# Patient Record
Sex: FEMALE | Race: WHITE | NOT HISPANIC OR LATINO | Employment: FULL TIME | URBAN - METROPOLITAN AREA
[De-identification: names, ages, dates, MRNs, and addresses within clinical notes are randomized per-mention and may not be internally consistent; named-entity substitution may affect disease eponyms.]

---

## 2017-01-03 RX ORDER — NORETHINDRONE ACETATE AND ETHINYL ESTRADIOL 1MG-20(21)
KIT ORAL
Qty: 28 TABLET | Refills: 2 | Status: SHIPPED | OUTPATIENT
Start: 2017-01-03 | End: 2022-11-21

## 2024-02-15 ENCOUNTER — LAB VISIT (OUTPATIENT)
Dept: LAB | Facility: HOSPITAL | Age: 49
End: 2024-02-15
Attending: NURSE PRACTITIONER
Payer: COMMERCIAL

## 2024-02-15 ENCOUNTER — OFFICE VISIT (OUTPATIENT)
Dept: PRIMARY CARE CLINIC | Facility: CLINIC | Age: 49
End: 2024-02-15
Payer: COMMERCIAL

## 2024-02-15 VITALS
OXYGEN SATURATION: 98 % | SYSTOLIC BLOOD PRESSURE: 120 MMHG | HEIGHT: 63 IN | WEIGHT: 275.44 LBS | DIASTOLIC BLOOD PRESSURE: 78 MMHG | HEART RATE: 94 BPM | BODY MASS INDEX: 48.8 KG/M2

## 2024-02-15 DIAGNOSIS — Z13.220 ENCOUNTER FOR LIPID SCREENING FOR CARDIOVASCULAR DISEASE: ICD-10-CM

## 2024-02-15 DIAGNOSIS — Z13.6 ENCOUNTER FOR LIPID SCREENING FOR CARDIOVASCULAR DISEASE: ICD-10-CM

## 2024-02-15 DIAGNOSIS — R06.81 APNEA: ICD-10-CM

## 2024-02-15 DIAGNOSIS — Z00.00 ANNUAL PHYSICAL EXAM: Primary | ICD-10-CM

## 2024-02-15 DIAGNOSIS — Z12.11 COLON CANCER SCREENING: ICD-10-CM

## 2024-02-15 DIAGNOSIS — Z11.59 ENCOUNTER FOR HEPATITIS C SCREENING TEST FOR LOW RISK PATIENT: ICD-10-CM

## 2024-02-15 DIAGNOSIS — Z00.00 ANNUAL PHYSICAL EXAM: ICD-10-CM

## 2024-02-15 DIAGNOSIS — E66.01 CLASS 3 SEVERE OBESITY DUE TO EXCESS CALORIES WITHOUT SERIOUS COMORBIDITY WITH BODY MASS INDEX (BMI) OF 45.0 TO 49.9 IN ADULT: ICD-10-CM

## 2024-02-15 DIAGNOSIS — Z13.1 SCREENING FOR DIABETES MELLITUS: ICD-10-CM

## 2024-02-15 PROBLEM — E66.813 CLASS 3 SEVERE OBESITY DUE TO EXCESS CALORIES WITHOUT SERIOUS COMORBIDITY WITH BODY MASS INDEX (BMI) OF 45.0 TO 49.9 IN ADULT: Status: ACTIVE | Noted: 2024-02-15

## 2024-02-15 LAB
ALBUMIN SERPL BCP-MCNC: 3.6 G/DL (ref 3.5–5.2)
ALP SERPL-CCNC: 42 U/L (ref 55–135)
ALT SERPL W/O P-5'-P-CCNC: 16 U/L (ref 10–44)
ANION GAP SERPL CALC-SCNC: 10 MMOL/L (ref 8–16)
AST SERPL-CCNC: 14 U/L (ref 10–40)
BILIRUB SERPL-MCNC: 0.3 MG/DL (ref 0.1–1)
BUN SERPL-MCNC: 10 MG/DL (ref 6–20)
CALCIUM SERPL-MCNC: 9 MG/DL (ref 8.7–10.5)
CHLORIDE SERPL-SCNC: 104 MMOL/L (ref 95–110)
CHOLEST SERPL-MCNC: 184 MG/DL (ref 120–199)
CHOLEST/HDLC SERPL: 3.9 {RATIO} (ref 2–5)
CO2 SERPL-SCNC: 25 MMOL/L (ref 23–29)
CREAT SERPL-MCNC: 0.8 MG/DL (ref 0.5–1.4)
EST. GFR  (NO RACE VARIABLE): >60 ML/MIN/1.73 M^2
GLUCOSE SERPL-MCNC: 87 MG/DL (ref 70–110)
HDLC SERPL-MCNC: 47 MG/DL (ref 40–75)
HDLC SERPL: 25.5 % (ref 20–50)
LDLC SERPL CALC-MCNC: 117 MG/DL (ref 63–159)
NONHDLC SERPL-MCNC: 137 MG/DL
POTASSIUM SERPL-SCNC: 4.4 MMOL/L (ref 3.5–5.1)
PROT SERPL-MCNC: 7.1 G/DL (ref 6–8.4)
SODIUM SERPL-SCNC: 139 MMOL/L (ref 136–145)
TRIGL SERPL-MCNC: 100 MG/DL (ref 30–150)
TSH SERPL DL<=0.005 MIU/L-ACNC: 1.42 UIU/ML (ref 0.4–4)

## 2024-02-15 PROCEDURE — 1159F MED LIST DOCD IN RCRD: CPT | Mod: CPTII,S$GLB,, | Performed by: NURSE PRACTITIONER

## 2024-02-15 PROCEDURE — 3074F SYST BP LT 130 MM HG: CPT | Mod: CPTII,S$GLB,, | Performed by: NURSE PRACTITIONER

## 2024-02-15 PROCEDURE — 3078F DIAST BP <80 MM HG: CPT | Mod: CPTII,S$GLB,, | Performed by: NURSE PRACTITIONER

## 2024-02-15 PROCEDURE — 1160F RVW MEDS BY RX/DR IN RCRD: CPT | Mod: CPTII,S$GLB,, | Performed by: NURSE PRACTITIONER

## 2024-02-15 PROCEDURE — 3008F BODY MASS INDEX DOCD: CPT | Mod: CPTII,S$GLB,, | Performed by: NURSE PRACTITIONER

## 2024-02-15 PROCEDURE — 85027 COMPLETE CBC AUTOMATED: CPT | Performed by: NURSE PRACTITIONER

## 2024-02-15 PROCEDURE — 36415 COLL VENOUS BLD VENIPUNCTURE: CPT | Performed by: NURSE PRACTITIONER

## 2024-02-15 PROCEDURE — 80061 LIPID PANEL: CPT | Performed by: NURSE PRACTITIONER

## 2024-02-15 PROCEDURE — 84443 ASSAY THYROID STIM HORMONE: CPT | Performed by: NURSE PRACTITIONER

## 2024-02-15 PROCEDURE — 80053 COMPREHEN METABOLIC PANEL: CPT | Performed by: NURSE PRACTITIONER

## 2024-02-15 PROCEDURE — 99203 OFFICE O/P NEW LOW 30 MIN: CPT | Mod: S$GLB,,, | Performed by: NURSE PRACTITIONER

## 2024-02-15 PROCEDURE — 99999 PR PBB SHADOW E&M-EST. PATIENT-LVL IV: CPT | Mod: PBBFAC,,, | Performed by: NURSE PRACTITIONER

## 2024-02-15 NOTE — PROGRESS NOTES
Assessment/Plan:    Problem List Items Addressed This Visit          Endocrine    Class 3 severe obesity due to excess calories without serious comorbidity with body mass index (BMI) of 45.0 to 49.9 in adult    Overview     Body mass index is 48.8 kg/m². Morbid obesity complicates all aspects of disease management from diagnostic modalities to treatment. Weight loss encouraged and health benefits explained to patient.             Other Visit Diagnoses       Annual physical exam    -  Primary    Relevant Orders    Comprehensive Metabolic Panel    CBC Without Differential    TSH    Encounter for lipid screening for cardiovascular disease        Relevant Orders    Lipid Panel    Encounter for hepatitis C screening test for low risk patient        Relevant Orders    HEPATITIS C ANTIBODY    Colon cancer screening        Relevant Orders    Ambulatory referral/consult to Endo Procedure     Apnea        Relevant Orders    Home Sleep Study    Screening for diabetes mellitus        Relevant Orders    HEMOGLOBIN A1C            Follow up in about 6 months (around 8/15/2024).    Matilda Yo NP  ______________________________________________________________________________________________________________________________    CC: Establish care    HPI:    Patient is a new patient to me here to establish care. 47y/o female with no past medical history.     Sleep Apnea: Patient presents with possible obstructive sleep apnea. Patent has a several months history of symptoms of daytime fatigue. Patient generally gets 5 or 6 hours of sleep per night, and states they generally have nightime awakenings and difficulty falling back asleep if awakened. Snoring of moderate severity is present. Apneic episodes is present. Nasal obstruction is not present.  Patient has not had tonsillectomy.       Previous PCP: None   [facility]-Dr. Mary Muñoz     No other new complaints today.  Remaining chronic conditions have been reviewed and  "remain stable. Further detail as stated above.      HM reviewed.    Past Medical History:  History reviewed. No pertinent past medical history.  Past Surgical History:   Procedure Laterality Date    ADENOIDECTOMY       SECTION      x 2     Review of patient's allergies indicates:  No Known Allergies  Social History     Tobacco Use    Smoking status: Never    Smokeless tobacco: Never   Substance Use Topics    Alcohol use: Yes     Comment: socially    Drug use: No     Family History   Problem Relation Age of Onset    Diabetes Father     Diabetes Daughter     Insulin resistance Paternal Aunt     Colon cancer Maternal Grandfather     Breast cancer Neg Hx     Cancer Neg Hx     Eclampsia Neg Hx     Hypertension Neg Hx     Miscarriages / Stillbirths Neg Hx      labor Neg Hx     Ovarian cancer Neg Hx     Stroke Neg Hx      Current Outpatient Medications on File Prior to Visit   Medication Sig Dispense Refill    [DISCONTINUED] multivitamin (THERAGRAN) per tablet Take 1 tablet by mouth once daily.      [DISCONTINUED] valACYclovir (VALTREX) 500 MG tablet Take 1 tablet (500 mg total) by mouth 2 (two) times daily. 60 tablet 5     No current facility-administered medications on file prior to visit.       Review of Systems   Constitutional: Negative.    HENT: Negative.     Eyes: Negative.    Respiratory: Negative.     Cardiovascular: Negative.    Gastrointestinal: Negative.    Endocrine: Negative.    Genitourinary: Negative.    Musculoskeletal: Negative.    Skin: Negative.    Allergic/Immunologic: Negative.    Neurological: Negative.    Hematological: Negative.    Psychiatric/Behavioral: Negative.         Vitals:    02/15/24 0748   BP: 120/78   Pulse: 94   SpO2: 98%   Weight: 125 kg (275 lb 7.4 oz)   Height: 5' 3" (1.6 m)       Wt Readings from Last 3 Encounters:   02/15/24 125 kg (275 lb 7.4 oz)   23 122.9 kg (271 lb)   22 (P) 122.5 kg (270 lb)       Physical Exam  Vitals and nursing note reviewed. "   Constitutional:       Appearance: She is well-developed. She is morbidly obese.   HENT:      Head: Normocephalic and atraumatic.   Eyes:      Conjunctiva/sclera: Conjunctivae normal.      Pupils: Pupils are equal, round, and reactive to light.   Cardiovascular:      Rate and Rhythm: Normal rate and regular rhythm.      Heart sounds: Normal heart sounds.   Pulmonary:      Effort: Pulmonary effort is normal.      Breath sounds: Normal breath sounds.   Abdominal:      General: Bowel sounds are normal.      Palpations: Abdomen is soft.   Musculoskeletal:         General: Normal range of motion.      Cervical back: Normal range of motion and neck supple.   Skin:     General: Skin is warm and dry.   Neurological:      Mental Status: She is alert and oriented to person, place, and time.      Deep Tendon Reflexes: Reflexes are normal and symmetric.   Psychiatric:         Behavior: Behavior normal.         Thought Content: Thought content normal.         Judgment: Judgment normal.         Health Maintenance   Topic Date Due    Hepatitis C Screening  Never done    Colorectal Cancer Screening  Never done    Lipid Panel  08/09/2022    TETANUS VACCINE  02/15/2025 (Originally 5/1/2021)    Mammogram  11/21/2024

## 2024-02-16 ENCOUNTER — LAB VISIT (OUTPATIENT)
Dept: LAB | Facility: HOSPITAL | Age: 49
End: 2024-02-16
Attending: NURSE PRACTITIONER
Payer: COMMERCIAL

## 2024-02-16 DIAGNOSIS — Z11.59 ENCOUNTER FOR HEPATITIS C SCREENING TEST FOR LOW RISK PATIENT: ICD-10-CM

## 2024-02-16 DIAGNOSIS — Z13.1 SCREENING FOR DIABETES MELLITUS: ICD-10-CM

## 2024-02-16 LAB
ERYTHROCYTE [DISTWIDTH] IN BLOOD BY AUTOMATED COUNT: 13.7 % (ref 11.5–14.5)
ESTIMATED AVG GLUCOSE: 111 MG/DL (ref 68–131)
HBA1C MFR BLD: 5.5 % (ref 4–5.6)
HCT VFR BLD AUTO: 46.5 % (ref 37–48.5)
HCV AB SERPL QL IA: NORMAL
HGB BLD-MCNC: 14.5 G/DL (ref 12–16)
MCH RBC QN AUTO: 28.2 PG (ref 27–31)
MCHC RBC AUTO-ENTMCNC: 31.2 G/DL (ref 32–36)
MCV RBC AUTO: 91 FL (ref 82–98)
PLATELET # BLD AUTO: 303 K/UL (ref 150–450)
PMV BLD AUTO: 9.9 FL (ref 9.2–12.9)
RBC # BLD AUTO: 5.14 M/UL (ref 4–5.4)
WBC # BLD AUTO: 8.28 K/UL (ref 3.9–12.7)

## 2024-02-16 PROCEDURE — 86803 HEPATITIS C AB TEST: CPT | Performed by: NURSE PRACTITIONER

## 2024-02-16 PROCEDURE — 83036 HEMOGLOBIN GLYCOSYLATED A1C: CPT | Performed by: NURSE PRACTITIONER

## 2024-02-16 PROCEDURE — 36415 COLL VENOUS BLD VENIPUNCTURE: CPT | Mod: PO | Performed by: NURSE PRACTITIONER

## 2024-02-19 ENCOUNTER — PATIENT MESSAGE (OUTPATIENT)
Dept: PRIMARY CARE CLINIC | Facility: CLINIC | Age: 49
End: 2024-02-19
Payer: COMMERCIAL

## 2024-04-10 ENCOUNTER — TELEPHONE (OUTPATIENT)
Dept: SLEEP MEDICINE | Facility: CLINIC | Age: 49
End: 2024-04-10
Payer: COMMERCIAL

## 2024-04-10 ENCOUNTER — PATIENT MESSAGE (OUTPATIENT)
Dept: ADMINISTRATIVE | Facility: HOSPITAL | Age: 49
End: 2024-04-10
Payer: COMMERCIAL

## 2024-04-10 ENCOUNTER — PATIENT OUTREACH (OUTPATIENT)
Dept: ADMINISTRATIVE | Facility: HOSPITAL | Age: 49
End: 2024-04-10
Payer: COMMERCIAL

## 2024-04-10 ENCOUNTER — PATIENT MESSAGE (OUTPATIENT)
Dept: PRIMARY CARE CLINIC | Facility: CLINIC | Age: 49
End: 2024-04-10
Payer: COMMERCIAL

## 2024-04-10 NOTE — PROGRESS NOTES
Replying to Campaign Questionnaire for Overdue HM:  Colon Screening    Patient due for colon cancer screening. Sent patient a message asking which colon screening method she prefers.

## 2024-06-04 ENCOUNTER — HOSPITAL ENCOUNTER (OUTPATIENT)
Dept: SLEEP MEDICINE | Facility: HOSPITAL | Age: 49
Discharge: HOME | End: 2024-06-04
Attending: NURSE PRACTITIONER
Payer: COMMERCIAL

## 2024-06-04 DIAGNOSIS — G47.33 OSA (OBSTRUCTIVE SLEEP APNEA): Primary | ICD-10-CM

## 2024-06-04 DIAGNOSIS — R06.81 APNEA: ICD-10-CM

## 2024-06-04 PROCEDURE — 95806 SLEEP STUDY UNATT&RESP EFFT: CPT | Performed by: INTERNAL MEDICINE

## 2024-06-04 PROCEDURE — 95800 SLP STDY UNATTENDED: CPT | Mod: 26,,, | Performed by: INTERNAL MEDICINE

## 2024-06-04 NOTE — PROCEDURES
PHYSICIAN INTERPRETATION AND COMMENTS: Findings are consistent with very severe, non-positional obstructive sleep  apnea(BRIELLE), with indications of respiratory control instability. Indications of cardiac dysrhythmia are recorded. Please  refer to sleep disorders for prompt management  CLINICAL HISTORY: 49 year old female presented with: 15 inch neck, BMI of 48.7, an [place] sleepiness score of 11, no comorbidities  and symptoms of nocturnal snoring, waking up choking and witnessed apneas. Based on the clinical history,  the patient has a high pre-test probability of having Moderate BRIELLE.  SLEEP STUDY FINDINGS: Patient underwent a 1 night Home Sleep Test and by behavioral criteria, slept for approximately  6.81 hours , with a sleep efficiency of 98%. Very Severe sleep disordered breathing (AHI=94) is noted based on a 4%  hypopnea desaturation criteria, with indications of respiratory control instability. The patient slept supine 34.7% of the  night based on valid recording time of 6.62 hours. The apneas/hypopneas are accompanied by severe oxygen desaturation  (percent time below 90% SpO2: 32%, Min SpO2: 50.3%). The average desaturation across all sleep disordered breathing  events is 15.2%. Snoring occurs for 30.3% (30 dB) of the study, 17.1% is extremely loud. The mean pulse rate is 87.8 BPM,  with very frequent pulse rate variability (113 events with >= 6 BPM increase/decrease per hour).  TREATMENT CONSIDERATIONS: Consider an attended CPAP titration study. Presence of respiratory control instability  should be evaluated by a sleep specialist prior to initiating positive pressure treatment. Consider nasal continuous  positive airway pressure for Very Severe obstructive sleep apnea. If the patient fails CPAP therapy, consider supplemental  oxygen.  DISEASE MANAGEMENT CONSIDERATIONS: Irregularity of the pulse rate signals indicates possible cardiac dysrhythmia. If  clinically appropriate, further cardiac evaluation is  "suggested. Morning headaches are symptoms that can be associated  with untreated BRIELLE.    Dear Matilda Yo, VALENTÍN  [address] [facility] [place] [address]/Matilda Yo NP         The sleep study that you ordered is complete.  You have ordered sleep LAB services to perform the sleep study for Brenda Church .      Please find Sleep Study result in  the "Media tab" of Chart Review menu.        You can look  for the report in the  Media by the document type "Sleep Study Documents". Alphabetizing  "Document type" column helps to find the SLEEP STUDY report  Faster.       As the ordering provider, you are responsible for reviewing the results and implementing a treatment plan with your patient.    If you need a Sleep Medicine provider to explain the sleep study findings and arrange treatment for the patient, please refer patient for consultation to our Sleep Clinic via [facility] with Ambulatory Consult Sleep.     To do that please place an order for an  "Ambulatory Consult Sleep" -  order , it will go to our clinic work queue for our staff  to contact the patient for an appointment.      For any questions, please contact our sleep lab  staff at [phone] to talk to clinical staff          Lev Manzanares MD   "

## 2024-06-05 DIAGNOSIS — G47.33 OSA (OBSTRUCTIVE SLEEP APNEA): Primary | ICD-10-CM

## 2024-06-05 NOTE — PROGRESS NOTES
Results have been released via my chart. Please verify that these have been reviewed by the pt. If not, please call pt with results.       I have reviewed results.     Your home sleep study was consistent with obstructive sleep apnea.  I have placed a referral for the sleep medicine clinic.  They will be reaching out to you.    Please to do not hesitate to reach out for any additional questions or concerns.     Matilda Yo NP

## 2024-06-07 ENCOUNTER — TELEPHONE (OUTPATIENT)
Dept: FAMILY MEDICINE | Facility: CLINIC | Age: 49
End: 2024-06-07
Payer: COMMERCIAL

## 2024-06-10 ENCOUNTER — TELEPHONE (OUTPATIENT)
Dept: FAMILY MEDICINE | Facility: CLINIC | Age: 49
End: 2024-06-10
Payer: COMMERCIAL

## 2024-06-17 ENCOUNTER — TELEPHONE (OUTPATIENT)
Dept: FAMILY MEDICINE | Facility: CLINIC | Age: 49
End: 2024-06-17
Payer: COMMERCIAL

## 2024-06-18 ENCOUNTER — TELEPHONE (OUTPATIENT)
Dept: FAMILY MEDICINE | Facility: CLINIC | Age: 49
End: 2024-06-18
Payer: COMMERCIAL

## 2024-06-25 ENCOUNTER — TELEPHONE (OUTPATIENT)
Dept: SLEEP MEDICINE | Facility: CLINIC | Age: 49
End: 2024-06-25
Payer: COMMERCIAL

## 2024-06-26 ENCOUNTER — OFFICE VISIT (OUTPATIENT)
Dept: SLEEP MEDICINE | Facility: CLINIC | Age: 49
End: 2024-06-26
Payer: COMMERCIAL

## 2024-06-26 VITALS
WEIGHT: 285.5 LBS | RESPIRATION RATE: 18 BRPM | OXYGEN SATURATION: 97 % | BODY MASS INDEX: 50.59 KG/M2 | HEART RATE: 86 BPM | SYSTOLIC BLOOD PRESSURE: 112 MMHG | HEIGHT: 63 IN | DIASTOLIC BLOOD PRESSURE: 80 MMHG

## 2024-06-26 DIAGNOSIS — R06.83 SNORING: ICD-10-CM

## 2024-06-26 DIAGNOSIS — G47.33 OSA (OBSTRUCTIVE SLEEP APNEA): ICD-10-CM

## 2024-06-26 DIAGNOSIS — R40.0 DAYTIME SLEEPINESS: ICD-10-CM

## 2024-06-26 DIAGNOSIS — E66.01 MORBID OBESITY WITH BMI OF 50.0-59.9, ADULT: Primary | ICD-10-CM

## 2024-06-26 DIAGNOSIS — G47.8 NON-RESTORATIVE SLEEP: ICD-10-CM

## 2024-06-26 PROCEDURE — 99999 PR PBB SHADOW E&M-EST. PATIENT-LVL IV: CPT | Mod: PBBFAC,,, | Performed by: NURSE PRACTITIONER

## 2024-06-26 NOTE — PROGRESS NOTES
Subjective:      Patient ID: Brenda Church is a 49 y.o. female.    Chief Complaint: Sleep Apnea (Rev sleep study)    HPI    Patient presents today for evaluation of sleep apnea.  Patient with snoring and witnessed apneas. Patient not having problems falling asleep, but wakes up frequently throughout the night.  Patient does not wake up feeling refreshed in the morning.  Patient with daytime hypersomnolence.  Patient has had symptoms for a few years. Comorbidities include BMI 50  Bedtime: 9PM  Wake time: 5AM    STOP - BANG Questionnaire:     1. Snoring : Do you snore loudly ?    Yes    2. Tired : Do you often feel tired, fatigued, or sleepy during daytime?   Yes    3. Observed: Has anyone observed you stop breathing during your sleep?   Yes    4. Blood pressure : Do you have or are you being treated for high blood pressure?   No    5. BMI :BMI more than 35 kg/m2?   Yes    6. Age : Age over 50 yr old?   No    7. Neck circumference: Neck circumference greater than 40 cm?   Yes    8. Gender: Gender male?   No    High risk of BRIELLE: Yes 5 - 8  Intermediate risk of BRIELLE: Yes 3 - 4  Low risk of BRIELLE: Yes 0 - 2      References:   STOP Questionnaire   A Tool to Screen Patients for Obstructive Sleep Apnea: ABELARDO Wolf.C.P.C., Gera Golden M.B.B.S., Abhinav Mariano M.D.,Luiza Dalal, Ph.D., Bobby Bingham M.B.B.S.,_ Alicia Lebron.,_ Dina Guillaume M.D., Henok Corral F.R.C.P.C.; Anesthesiology 2008; 108:812-21 Copyright © 2008, the American Society of Anesthesiologists, Inc. [employer]       [place] Questionnaire (validated BRIELLE screening questionnaire)    Yes -- Snoring/apnea    Yes -- Fatigue    Body mass index is Body mass index is 50.57 kg/m²..  (>25 is overweight, >30 is obese)    Blood Pressure = normal blood pressure  (PreHTN 120-139/80-89, Stg1 140-159/90-99, Stg2 >160/>100)  [place] = two of three BRIELLE categories are positive (high risk is 2-3 positive categories)          "6/26/2024    10:20 AM   EPWORTH SLEEPINESS SCALE   Sitting and reading 1   Watching TV 1   Sitting, inactive in a public place (e.g. a theatre or a meeting) 0   As a passenger in a car for an hour without a break 0   Lying down to rest in the afternoon when circumstances permit 3   Sitting and talking to someone 1   Sitting quietly after a lunch without alcohol 1   In a car, while stopped for a few minutes in traffic 0   Total score 7      (validated sleepiness questionnaire with a higher score indicating greater sleepiness; range 0-24)    Patient Active Problem List   Diagnosis    Class 3 severe obesity due to excess calories without serious comorbidity with body mass index (BMI) of 45.0 to 49.9 in adult    Apnea         /80   Pulse 86   Resp 18   Ht 5' 3" (1.6 m)   Wt 129.5 kg (285 lb 7.9 oz)   SpO2 97%   BMI 50.57 kg/m²   Body mass index is 50.57 kg/m².    Review of Systems   Constitutional:  Positive for fatigue.   HENT: Negative.     Respiratory:  Positive for snoring and somnolence.    Cardiovascular: Negative.    Musculoskeletal: Negative.    Gastrointestinal: Negative.    Neurological: Negative.    Psychiatric/Behavioral:  Positive for sleep disturbance.          Objective:      Physical Exam  Constitutional:       Appearance: She is well-developed. She is obese.   HENT:      Head: Normocephalic and atraumatic.      Nose: Nose normal.      Mouth/Throat:      Comments: Mallampati Score: III    Cardiovascular:      Rate and Rhythm: Normal rate and regular rhythm.      Heart sounds: No murmur heard.     No gallop.   Pulmonary:      Effort: Pulmonary effort is normal.      Breath sounds: Normal breath sounds.   Abdominal:      Palpations: Abdomen is soft.   Musculoskeletal:         General: Normal range of motion.      Cervical back: Normal range of motion and neck supple.   Skin:     General: Skin is warm and dry.   Neurological:      Mental Status: She is alert and oriented to person, place, and " time.   Psychiatric:         Mood and Affect: Mood normal.         Behavior: Behavior normal.       Personal Diagnostic Review      Assessment:     1. Morbid obesity with BMI of 50.0-59.9, adult    2. BRIELLE (obstructive sleep apnea)    3. Snoring    4. Non-restorative sleep    5. Daytime sleepiness       No outpatient encounter medications on file as of 6/26/2024.     No facility-administered encounter medications on file as of 6/26/2024.     Orders Placed This Encounter   Procedures    CPAP FOR HOME USE     Order Specific Question:   Length of need (1-99 months):     Answer:   99     Order Specific Question:   Type ():     Answer:   Auto CPAP     Order Specific Question:   Auto CPAP pressure setting range (cmH20):     Answer:   5-16     Order Specific Question:   Fulfillment Priority:     Answer:   Level 1:   AHI 60 or greater     Order Specific Question:   Humidification ():     Answer:   Heated     Order Specific Question:   Choose ONE mask type and its corresponding cushions and/or pillows:     Answer:    Full Face Mask, 1 per 90 days:  Full Face Cushion, (3 per 90 days)     Order Specific Question:   Choose EITHER Heated or Non-Heated Tubjing     Answer:    Non-Heated Tubing, 1 per 90 days     Order Specific Question:   All other supplies as needed as listed below:     Answer:    Headgear, 1 per 180 days     Order Specific Question:   All other supplies as needed as listed below:     Answer:    Disposable Filter, 6 per 90 days     Order Specific Question:   All other supplies as needed as listed below:     Answer:    Non-Disposable Filter, 1 per 180 days     Order Specific Question:   All other supplies as needed as listed below:     Answer:    Humidifier Chamber, 1 per 180 days     Plan:   AutoPAP and follow up in 10 weeks with download of data card and review of symptoms.  Close follow up  Weight loss and exercise to improve overall health.    1. Morbid obesity with  BMI of 50.0-59.9, adult    2. BRIELLE (obstructive sleep apnea)  -     Ambulatory referral/consult to Sleep Disorders  -     CPAP FOR HOME USE    3. Snoring    4. Non-restorative sleep    5. Daytime sleepiness

## 2024-06-26 NOTE — PATIENT INSTRUCTIONS
An order has been placed for AutoPAP machine and has been sent to a medical equipment company. The medical equipment company will send all the needed information to your insurance provider for approval. Shortly, you will be receiving a phone call about scheduling you for AutoPAP set up. If you do not hear from the company within 2 weeks, please make us aware by calling us or by sending a message through the patient portal online. You can also call them directly at   [facility]   Toll free 24 hr line for assistance: [phone]  Option 1: Service on Equipment  Option 2: Delivery scheduled and would like update  Option 3: CPAP                  Option 1:  Supplies                  Option 2:  Questions regarding machine  Option 4: Oxygen, Nebulizer, Ventilator  Option 5: Discharge (, providers, nurses)  Option 6: Diabetics  Option 7: Ortho (ortho braces, walker, wheelchairs, etc)  Option 8: Billing       You will also need to follow back up in the clinic with your provider in 10 weeks to review compliance of your AutoPAP. Your insurance requires documented compliance (wearing over 4hrs a night). Please bring the AutoPAP with you to the follow up visit.

## 2024-09-25 ENCOUNTER — OFFICE VISIT (OUTPATIENT)
Dept: SLEEP MEDICINE | Facility: CLINIC | Age: 49
End: 2024-09-25
Payer: COMMERCIAL

## 2024-09-25 VITALS — BODY MASS INDEX: 50.59 KG/M2 | WEIGHT: 285.5 LBS | HEIGHT: 63 IN

## 2024-09-25 DIAGNOSIS — G47.33 OSA (OBSTRUCTIVE SLEEP APNEA): Primary | ICD-10-CM

## 2024-09-25 DIAGNOSIS — E66.01 MORBID OBESITY WITH BMI OF 50.0-59.9, ADULT: ICD-10-CM

## 2024-09-25 PROCEDURE — 1160F RVW MEDS BY RX/DR IN RCRD: CPT | Mod: CPTII,95,, | Performed by: NURSE PRACTITIONER

## 2024-09-25 PROCEDURE — 1159F MED LIST DOCD IN RCRD: CPT | Mod: CPTII,95,, | Performed by: NURSE PRACTITIONER

## 2024-09-25 PROCEDURE — 3044F HG A1C LEVEL LT 7.0%: CPT | Mod: CPTII,95,, | Performed by: NURSE PRACTITIONER

## 2024-09-25 PROCEDURE — 99213 OFFICE O/P EST LOW 20 MIN: CPT | Mod: 95,,, | Performed by: NURSE PRACTITIONER

## 2024-09-25 PROCEDURE — 3008F BODY MASS INDEX DOCD: CPT | Mod: CPTII,95,, | Performed by: NURSE PRACTITIONER

## 2024-09-25 NOTE — PROGRESS NOTES
"Subjective:      Patient ID: Brenda Church is a 49 y.o. female.    Chief Complaint: Sleep Apnea  The patient location is: [place]  The chief complaint leading to consultation is: sleep apnea  Visit type: Virtual visit with synchronous audio and video  Total time spent with patient: 12 min   Each patient to whom he or she provides medical services by telemedicine is:  (1) informed of the relationship between the physician and patient and the respective role of any other health care provider with respect to management of the patient; and (2) notified that he or she may decline to receive medical services by telemedicine and may withdraw from such care at any time.    HPI  Follow up for BRIELLE. Recently dx with very severe BRIELLE- AHI 94/hr. Started AutoPAP therapy. Patient states improved symptoms with use of AutoPAP. Sleeping more soundly. Waking up feeling more refreshed. Improved daytime sleepiness. Patient states she is benefiting from use of the AutoPAP.        Patient Active Problem List   Diagnosis    Class 3 severe obesity due to excess calories without serious comorbidity with body mass index (BMI) of 45.0 to 49.9 in adult    Apnea     Ht 5' 3" (1.6 m)   Wt 129.5 kg (285 lb 7.9 oz)   BMI 50.57 kg/m²   Body mass index is 50.57 kg/m².    Review of Systems   Constitutional: Negative.    HENT: Negative.     Respiratory: Negative.     Cardiovascular: Negative.    Musculoskeletal: Negative.    Gastrointestinal: Negative.    Neurological: Negative.    Psychiatric/Behavioral: Negative.       Objective:      Physical Exam  Constitutional:       Appearance: She is well-developed. She is obese.   HENT:      Head: Normocephalic and atraumatic.   Pulmonary:      Effort: Pulmonary effort is normal. No tachypnea, bradypnea, accessory muscle usage or respiratory distress.   Musculoskeletal:      Cervical back: Normal range of motion.   Skin:     Findings: No rash.   Neurological:      Mental Status: She is alert and oriented to " person, place, and time.   Psychiatric:         Behavior: Behavior normal.         Thought Content: Thought content normal.         Judgment: Judgment normal.       Personal Diagnostic Review      9/25/2024     1:31 PM   EPWORTH SLEEPINESS SCALE   Sitting and reading 1   Watching TV 1   Sitting, inactive in a public place (e.g. a theatre or a meeting) 0   As a passenger in a car for an hour without a break 1   Lying down to rest in the afternoon when circumstances permit 3   Sitting and talking to someone 0   Sitting quietly after a lunch without alcohol 0   In a car, while stopped for a few minutes in traffic 0   Total score 6      [id number] [address], [address]  Compliance Report  Compliance  Payor Standard  Usage 08/25/2024 - 09/23/2024  Usage days 30/30 days (100%)  >= 4 hours 29 days (97%)  < 4 hours 1 days (3%)  Usage hours 192 hours 52 minutes  Average usage (total days) 6 hours 26 minutes  Average usage (days used) 6 hours 26 minutes  Median usage (days used) 6 hours 22 minutes  Total used hours (value since last reset - 09/23/2024) 436 hours  AirSense 11 AutoSet  Serial number [id number]  Mode AutoSet  Min Pressure 5 cmH2O  Max Pressure 16 cmH2O  EPR Ramp Only  EPR level 2  Response Standard  Therapy  Pressure - cmH2O Median: 14.9 95th percentile: 16.0 Maximum: 16.0  Leaks - L/min Median: 1.9 95th percentile: 7.1 Maximum: 10.1  Events per hour AI: 1.0 HI: 0.9 AHI: 1.9  Apnea Index Central: 0.1 Obstructive: 0.9 Unknown: 0.0    Assessment:       1. BRIELLE (obstructive sleep apnea)    2. Morbid obesity with BMI of 50.0-59.9, adult        No outpatient encounter medications on file as of 9/25/2024.     No facility-administered encounter medications on file as of 9/25/2024.     No orders of the defined types were placed in this encounter.    Plan:     Compliant with PAP and benefits from use. Follow up 6 months in the sleep clinic.    1. BRIELLE (obstructive sleep apnea)    2. Morbid obesity with  BMI of 50.0-59.9, adult     Weight loss and exercise to improve overall health.                     Elizabeth LeJeune, ACNP, ANP

## 2024-09-28 ENCOUNTER — PATIENT MESSAGE (OUTPATIENT)
Dept: ADMINISTRATIVE | Facility: HOSPITAL | Age: 49
End: 2024-09-28
Payer: COMMERCIAL

## 2024-09-28 ENCOUNTER — PATIENT MESSAGE (OUTPATIENT)
Dept: PRIMARY CARE CLINIC | Facility: CLINIC | Age: 49
End: 2024-09-28
Payer: COMMERCIAL

## 2024-09-28 DIAGNOSIS — E66.01 CLASS 3 SEVERE OBESITY DUE TO EXCESS CALORIES WITHOUT SERIOUS COMORBIDITY WITH BODY MASS INDEX (BMI) OF 45.0 TO 49.9 IN ADULT: ICD-10-CM

## 2024-09-28 DIAGNOSIS — E66.813 CLASS 3 SEVERE OBESITY DUE TO EXCESS CALORIES WITHOUT SERIOUS COMORBIDITY WITH BODY MASS INDEX (BMI) OF 45.0 TO 49.9 IN ADULT: ICD-10-CM

## 2024-09-28 DIAGNOSIS — R73.03 PREDIABETES: Primary | ICD-10-CM

## 2024-09-30 ENCOUNTER — PATIENT OUTREACH (OUTPATIENT)
Dept: ADMINISTRATIVE | Facility: HOSPITAL | Age: 49
End: 2024-09-30
Payer: COMMERCIAL

## 2024-09-30 DIAGNOSIS — Z12.11 SCREENING FOR MALIGNANT NEOPLASM OF COLON: Primary | ICD-10-CM

## 2024-09-30 RX ORDER — TIRZEPATIDE 2.5 MG/.5ML
2.5 INJECTION, SOLUTION SUBCUTANEOUS
Qty: 4 PEN | Refills: 1 | Status: SHIPPED | OUTPATIENT
Start: 2024-09-30

## 2024-09-30 NOTE — PROGRESS NOTES
Replying to Campaign Questionnaire for Overdue HM: Colon Cancer Screening    Called and spoke with patient who states that there was supposed to have been an order placed for the Cologuard back in February. No order found.  Cologuard order placed today per [facility].   Portal message sent to patient for f/u if kit not received.